# Patient Record
Sex: FEMALE | Race: WHITE | Employment: OTHER | URBAN - NONMETROPOLITAN AREA
[De-identification: names, ages, dates, MRNs, and addresses within clinical notes are randomized per-mention and may not be internally consistent; named-entity substitution may affect disease eponyms.]

---

## 2017-02-23 ENCOUNTER — OFFICE VISIT (OUTPATIENT)
Dept: FAMILY MEDICINE CLINIC | Age: 32
End: 2017-02-23

## 2017-02-23 VITALS
HEART RATE: 62 BPM | TEMPERATURE: 97.7 F | HEIGHT: 64 IN | DIASTOLIC BLOOD PRESSURE: 60 MMHG | SYSTOLIC BLOOD PRESSURE: 92 MMHG | OXYGEN SATURATION: 98 % | RESPIRATION RATE: 12 BRPM | WEIGHT: 133 LBS | BODY MASS INDEX: 22.71 KG/M2

## 2017-02-23 DIAGNOSIS — H92.03 OTALGIA, BILATERAL: ICD-10-CM

## 2017-02-23 DIAGNOSIS — Z76.89 ENCOUNTER TO ESTABLISH CARE: Primary | ICD-10-CM

## 2017-02-23 PROCEDURE — 99395 PREV VISIT EST AGE 18-39: CPT | Performed by: NURSE PRACTITIONER

## 2017-02-23 RX ORDER — MEDROXYPROGESTERONE ACETATE 10 MG/1
20 TABLET ORAL DAILY
COMMUNITY
End: 2018-02-23 | Stop reason: ALTCHOICE

## 2017-02-23 ASSESSMENT — ENCOUNTER SYMPTOMS
ALLERGIC/IMMUNOLOGIC NEGATIVE: 1
RESPIRATORY NEGATIVE: 1
DIARRHEA: 0
GASTROINTESTINAL NEGATIVE: 1
SINUS PRESSURE: 0
CONSTIPATION: 0
CHEST TIGHTNESS: 0
SHORTNESS OF BREATH: 0
ABDOMINAL PAIN: 0
VOMITING: 0
COUGH: 0
EYES NEGATIVE: 1
NAUSEA: 0
TROUBLE SWALLOWING: 0

## 2017-10-20 ENCOUNTER — HOSPITAL ENCOUNTER (OUTPATIENT)
Dept: LAB | Age: 32
Setting detail: SPECIMEN
Discharge: HOME | End: 2017-10-20
Payer: COMMERCIAL

## 2017-10-20 LAB
ABSOLUTE EOS #: 0.1 K/UL (ref 0–0.4)
ABSOLUTE IMMATURE GRANULOCYTE: NORMAL K/UL (ref 0–0.3)
ABSOLUTE LYMPH #: 1.6 K/UL (ref 1–4.8)
ABSOLUTE MONO #: 0.3 K/UL (ref 0.1–1.2)
BASOPHILS # BLD: 1 % (ref 0–1)
BASOPHILS ABSOLUTE: 0.1 K/UL (ref 0–0.2)
DIFFERENTIAL TYPE: NORMAL
EOSINOPHILS RELATIVE PERCENT: 3 % (ref 1–7)
FOLLICLE STIMULATING HORMONE: 5.9 U/L (ref 1.7–21.5)
GLUCOSE BLD-MCNC: 89 MG/DL (ref 70–99)
HCG QUANTITATIVE: <1 IU/L
HCT VFR BLD CALC: 41.9 % (ref 36–46)
HEMOGLOBIN: 14.2 G/DL (ref 12–16)
IMMATURE GRANULOCYTES: NORMAL %
INSULIN COMMENT: NORMAL
INSULIN REFERENCE RANGE:: NORMAL
INSULIN: 5.7 MU/L
LH: 5.6 U/L (ref 1–95.6)
LYMPHOCYTES # BLD: 33 % (ref 16–46)
MCH RBC QN AUTO: 28.9 PG (ref 26–34)
MCHC RBC AUTO-ENTMCNC: 33.9 G/DL (ref 31–37)
MCV RBC AUTO: 85.3 FL (ref 80–100)
MONOCYTES # BLD: 6 % (ref 4–11)
PDW BLD-RTO: 14.3 % (ref 11–14.5)
PLATELET # BLD: 244 K/UL (ref 140–450)
PLATELET ESTIMATE: NORMAL
PMV BLD AUTO: 8.6 FL (ref 6–12)
PROGESTERONE LEVEL: 0.08 NG/ML
PROLACTIN: 39.47 UG/L (ref 4.79–23.3)
RBC # BLD: 4.91 M/UL (ref 4–5.2)
RBC # BLD: NORMAL 10*6/UL
SEG NEUTROPHILS: 57 % (ref 43–77)
SEGMENTED NEUTROPHILS ABSOLUTE COUNT: 2.8 K/UL (ref 1.8–7.7)
THYROXINE, FREE: 0.95 NG/DL (ref 0.93–1.7)
TSH SERPL DL<=0.05 MIU/L-ACNC: 1.37 MIU/L (ref 0.3–5)
WBC # BLD: 4.8 K/UL (ref 3.5–11)
WBC # BLD: NORMAL 10*3/UL

## 2017-10-20 PROCEDURE — 84443 ASSAY THYROID STIM HORMONE: CPT

## 2017-10-20 PROCEDURE — 83001 ASSAY OF GONADOTROPIN (FSH): CPT

## 2017-10-20 PROCEDURE — 82947 ASSAY GLUCOSE BLOOD QUANT: CPT

## 2017-10-20 PROCEDURE — 84146 ASSAY OF PROLACTIN: CPT

## 2017-10-20 PROCEDURE — 83525 ASSAY OF INSULIN: CPT

## 2017-10-20 PROCEDURE — 36415 COLL VENOUS BLD VENIPUNCTURE: CPT

## 2017-10-20 PROCEDURE — 84439 ASSAY OF FREE THYROXINE: CPT

## 2017-10-20 PROCEDURE — 84702 CHORIONIC GONADOTROPIN TEST: CPT

## 2017-10-20 PROCEDURE — 85025 COMPLETE CBC W/AUTO DIFF WBC: CPT

## 2017-10-20 PROCEDURE — 83002 ASSAY OF GONADOTROPIN (LH): CPT

## 2017-10-20 PROCEDURE — 84144 ASSAY OF PROGESTERONE: CPT

## 2017-12-18 ENCOUNTER — HOSPITAL ENCOUNTER (OUTPATIENT)
Dept: LAB | Age: 32
Setting detail: SPECIMEN
Discharge: HOME | End: 2017-12-18
Payer: COMMERCIAL

## 2017-12-18 LAB — PROLACTIN: 46.79 UG/L (ref 4.79–23.3)

## 2017-12-18 PROCEDURE — 84146 ASSAY OF PROLACTIN: CPT

## 2017-12-18 PROCEDURE — 36415 COLL VENOUS BLD VENIPUNCTURE: CPT

## 2018-02-23 ENCOUNTER — OFFICE VISIT (OUTPATIENT)
Dept: FAMILY MEDICINE CLINIC | Age: 33
End: 2018-02-23
Payer: COMMERCIAL

## 2018-02-23 VITALS
HEIGHT: 64 IN | SYSTOLIC BLOOD PRESSURE: 100 MMHG | WEIGHT: 139.8 LBS | RESPIRATION RATE: 12 BRPM | DIASTOLIC BLOOD PRESSURE: 60 MMHG | HEART RATE: 64 BPM | OXYGEN SATURATION: 100 % | BODY MASS INDEX: 23.87 KG/M2 | TEMPERATURE: 97.9 F

## 2018-02-23 DIAGNOSIS — Z00.00 EXAMINATION, MEDICAL, GENERAL: Primary | ICD-10-CM

## 2018-02-23 PROCEDURE — 99395 PREV VISIT EST AGE 18-39: CPT | Performed by: NURSE PRACTITIONER

## 2018-02-23 RX ORDER — METFORMIN HYDROCHLORIDE 500 MG/1
500 TABLET, EXTENDED RELEASE ORAL DAILY
COMMUNITY
Start: 2018-02-22 | End: 2022-10-17 | Stop reason: ALTCHOICE

## 2018-02-23 RX ORDER — MEDROXYPROGESTERONE ACETATE 10 MG/1
20 TABLET ORAL DAILY
COMMUNITY
End: 2018-02-23 | Stop reason: SDUPTHER

## 2018-02-23 ASSESSMENT — ENCOUNTER SYMPTOMS
CONSTIPATION: 0
COUGH: 0
NAUSEA: 0
SHORTNESS OF BREATH: 0
ABDOMINAL PAIN: 0
TROUBLE SWALLOWING: 0
GASTROINTESTINAL NEGATIVE: 1
SINUS PRESSURE: 0
RESPIRATORY NEGATIVE: 1
VOMITING: 0
DIARRHEA: 0
CHEST TIGHTNESS: 0
ALLERGIC/IMMUNOLOGIC NEGATIVE: 1
EYES NEGATIVE: 1

## 2018-02-23 ASSESSMENT — PATIENT HEALTH QUESTIONNAIRE - PHQ9
1. LITTLE INTEREST OR PLEASURE IN DOING THINGS: 0
2. FEELING DOWN, DEPRESSED OR HOPELESS: 0
SUM OF ALL RESPONSES TO PHQ9 QUESTIONS 1 & 2: 0
SUM OF ALL RESPONSES TO PHQ QUESTIONS 1-9: 0

## 2018-02-23 NOTE — PROGRESS NOTES
[facility]    Subjective:      Patient ID: Xander Beverly is a 27 y.o. y.o. female. HPI Patient in for office for annual routine health maintenance of chronic conditions and medication refills. I have reviewed the patient's medical history in detail and updated the computerized patient record. She voices no concerns at this visit. She is currently seeing OB/GYN in [facility] as she is trying to get pregnant. She is also seeing an endocrinologist as well. She is on Metformin and progesterone for them for this but just started this week. She states that last year they failed IVF so they are trying something different. Past Medical History:   Diagnosis Date    Headache        Past Surgical History:   Procedure Laterality Date    DILATION AND CURETTAGE OF UTERUS      Aug 2016 & Dec 2016       Family History   Problem Relation Age of Onset    Diabetes Father     High Blood Pressure Father     Other Sister      endometrious/hysterectomy       Allergies   Allergen Reactions    Peanut-Containing Drug Products Other (See Comments)       Current Outpatient Prescriptions   Medication Sig Dispense Refill    metFORMIN (GLUCOPHAGE-XR) 500 MG extended release tablet Take 500 mg by mouth daily      Progesterone Micronized (PROGESTERONE PO) Take 20 mg by mouth daily      Prenatal MV-Min-Fe Fum-FA-DHA (PRENATAL 1 PO) Take 1 capsule by mouth daily      b complex vitamins capsule Take 1 capsule by mouth daily       No current facility-administered medications for this visit. Review of Systems   Constitutional: Negative for activity change, appetite change and fever. HENT: Negative. Negative for congestion, ear pain, sinus pressure and trouble swallowing. Eyes: Negative. Respiratory: Negative. Negative for cough, chest tightness and shortness of breath. Cardiovascular: Negative for chest pain and palpitations. Gastrointestinal: Negative.   Negative for abdominal pain, of care. Follow up in one year or sooner if needed.        Nell Obrien NP  2/23/2018 1:30 PM

## 2018-02-23 NOTE — PROGRESS NOTES
Visit Information    Have you changed or started any medications since your last visit including any over-the-counter medicines, vitamins, or herbal medicines? no   Are you having any side effects from any of your medications? -  no  Have you stopped taking any of your medications? Is so, why? -  no    Have you seen any other physician or provider since your last visit? Yes - Records Requested  Have you had any other diagnostic tests since your last visit? No  Have you been seen in the emergency room and/or had an admission to a hospital since we last saw you? No  Have you had your routine dental cleaning in the past 6 months? yes - [facility]     Have you activated your [employer] account? If not, what are your barriers?  No: pending     Patient Care Team:  Nell Obrien NP as PCP - General (Family Medicine)    Medical History Review  Past Medical, Family, and Social History reviewed and does contribute to the patient presenting condition    Health Maintenance   Topic Date Due    Cervical cancer screen  03/15/2004    DTaP/Tdap/Td vaccine (1 - Tdap) 02/23/2018 (Originally 3/15/2002)    Flu vaccine (1) 02/23/2018 (Originally 9/1/2017)    HIV screen  Completed

## 2018-09-24 ENCOUNTER — HOSPITAL ENCOUNTER (OUTPATIENT)
Dept: LAB | Age: 33
Setting detail: SPECIMEN
Discharge: HOME | End: 2018-09-24
Payer: COMMERCIAL

## 2018-09-24 LAB — PROGESTERONE LEVEL: 0.17 NG/ML

## 2018-09-24 PROCEDURE — 84144 ASSAY OF PROGESTERONE: CPT

## 2018-09-24 PROCEDURE — 36415 COLL VENOUS BLD VENIPUNCTURE: CPT

## 2018-09-26 ENCOUNTER — HOSPITAL ENCOUNTER (OUTPATIENT)
Dept: LAB | Age: 33
Setting detail: SPECIMEN
Discharge: HOME | End: 2018-09-26
Payer: COMMERCIAL

## 2018-09-26 LAB — PROGESTERONE LEVEL: 7.18 NG/ML

## 2018-09-26 PROCEDURE — 84144 ASSAY OF PROGESTERONE: CPT

## 2018-09-26 PROCEDURE — 36415 COLL VENOUS BLD VENIPUNCTURE: CPT

## 2018-11-19 ENCOUNTER — HOSPITAL ENCOUNTER (OUTPATIENT)
Dept: LAB | Age: 33
Discharge: HOME | End: 2018-11-19
Payer: COMMERCIAL

## 2018-11-19 LAB — HCG QUANTITATIVE: 192 IU/L

## 2018-11-19 PROCEDURE — 84702 CHORIONIC GONADOTROPIN TEST: CPT

## 2018-11-19 PROCEDURE — 36415 COLL VENOUS BLD VENIPUNCTURE: CPT

## 2018-11-21 ENCOUNTER — HOSPITAL ENCOUNTER (OUTPATIENT)
Dept: LAB | Age: 33
Discharge: HOME | End: 2018-11-21
Payer: COMMERCIAL

## 2018-11-21 LAB
HCG QUANTITATIVE: 408 IU/L
PROGESTERONE LEVEL: 34.21 NG/ML

## 2018-11-21 PROCEDURE — 84702 CHORIONIC GONADOTROPIN TEST: CPT

## 2018-11-21 PROCEDURE — 84144 ASSAY OF PROGESTERONE: CPT

## 2018-11-21 PROCEDURE — 36415 COLL VENOUS BLD VENIPUNCTURE: CPT

## 2018-12-05 ENCOUNTER — HOSPITAL ENCOUNTER (OUTPATIENT)
Dept: LAB | Age: 33
Discharge: HOME | End: 2018-12-05
Payer: COMMERCIAL

## 2018-12-05 LAB — PROGESTERONE LEVEL: 26.39 NG/ML

## 2018-12-05 PROCEDURE — 36415 COLL VENOUS BLD VENIPUNCTURE: CPT

## 2018-12-05 PROCEDURE — 84144 ASSAY OF PROGESTERONE: CPT

## 2022-10-17 ENCOUNTER — OFFICE VISIT (OUTPATIENT)
Dept: PODIATRY | Age: 37
End: 2022-10-17
Payer: COMMERCIAL

## 2022-10-17 VITALS
HEART RATE: 68 BPM | DIASTOLIC BLOOD PRESSURE: 62 MMHG | SYSTOLIC BLOOD PRESSURE: 106 MMHG | RESPIRATION RATE: 20 BRPM | WEIGHT: 152 LBS | BODY MASS INDEX: 26.08 KG/M2

## 2022-10-17 DIAGNOSIS — L60.8 LONGITUDINAL MELANONYCHIA: Primary | ICD-10-CM

## 2022-10-17 PROCEDURE — G8484 FLU IMMUNIZE NO ADMIN: HCPCS | Performed by: PODIATRIST

## 2022-10-17 PROCEDURE — G8427 DOCREV CUR MEDS BY ELIG CLIN: HCPCS | Performed by: PODIATRIST

## 2022-10-17 PROCEDURE — G8419 CALC BMI OUT NRM PARAM NOF/U: HCPCS | Performed by: PODIATRIST

## 2022-10-17 PROCEDURE — 1036F TOBACCO NON-USER: CPT | Performed by: PODIATRIST

## 2022-10-17 PROCEDURE — 99203 OFFICE O/P NEW LOW 30 MIN: CPT | Performed by: PODIATRIST

## 2022-10-17 NOTE — PROGRESS NOTES
Subjective:  Delta Ellis is a 42 y.o. female who presents to the office today complaining of discolored toenail. .  Symptoms began 20 year(s) ago. Area has not changed at all over time. Always looked the same. Patient relates pain is Absent . Pain is rated 0 out of 10 and is described as none. Treatments prior to today's visit include: none. Currently denies F/C/N/V. Allergies   Allergen Reactions    Peanut-Containing Drug Products Other (See Comments)       Past Medical History:   Diagnosis Date    Headache        Prior to Admission medications    Medication Sig Start Date End Date Taking? Authorizing Provider   Progesterone Micronized (PROGESTERONE PO) Take 20 mg by mouth daily   Yes Historical Provider, MD   medroxyPROGESTERone (PROVERA) 10 MG tablet Take 20 mg by mouth daily  2/23/18  Historical Provider, MD       Past Surgical History:   Procedure Laterality Date    DILATION AND CURETTAGE OF UTERUS      Aug 2016 & Dec 2016    MYOMECTOMY         Family History   Problem Relation Age of Onset    Diabetes Father     High Blood Pressure Father     Other Sister         endometrious/hysterectomy       Social History     Tobacco Use    Smoking status: Never    Smokeless tobacco: Never   Substance Use Topics    Alcohol use: Yes     Alcohol/week: 0.0 standard drinks     Comment: socially       ROS: All 14 ROS systems reviewed and pertinent positives noted above, all others negative. Lower Extremity Physical Examination:     Vitals:   Vitals:    10/17/22 1514   BP: 106/62   Pulse: 68   Resp: 20     General: AAO x 3 in NAD. Vascular: DP and PT pulses palpable 2/4, bilateral.  CFT <3 seconds, bilateral.  Hair growth present to the level of the digits, bilateral.  Edema absent, bilateral.  Varicosities absent, bilateral. Erythema absent, bilateral. Distal Rubor absent bilateral.  Temperature within normal limits bilateral. Hyperpigmentation absent bilateral. No atrophic skin.    Neurological: Sensation intact to light touch to level of digits, bilateral.  Protective sensation intact 10/10 sites via 5.07/10g Ty Ty-Sid Monofilament, bilateral.  negative Tinel's, bilateral.  negative Valleix sign, bilateral.  Vibratory intact bilateral.  Reflexes Decreased bilateral.  Paresthesias negative. Dysthesias negative. Sharp/dull intact bilateral.   Musculoskeletal: Muscle strength 5/5, bilateral.  Pain absent upon palpation bilateral. Normal medial longitudinal arch, bilateral.  Ankle ROM within normal limits,bilateral.  1st MPJ ROM within normal limits, bilateral.  Dorsally contracted digits absent. No other foot deformities. Integument: Warm, dry, supple, Bilateral.  Open lesion absent, Bilateral.  Interdigital maceration absent to web spaces absent, Bilateral.  Nails left 1 central longitudinal melanonychia. 3 mm width and completely uniform color distribution. Fissures absent, Bilateral. Hyperkeratotic tissue is absent. Asessment: Patient is a 42 y.o. female with:    Diagnosis Orders   1. Longitudinal melanonychia            Plan: Patient examined and evaluated. Current condition and treatment options discussed in detail. Picture taken L hallux. Width of area 3 mm. Patient advised the risk of complications of her situation, we will hold off on a nail unit biopsy. Patient wishes to wait on that at this time. Patient advised  to watch closely for any widening of the longitudinal melanonychia left great toe. Patient also advised a darker color discolorations would want to proceed with biopsy. Patient low level medical decision making. Patient is stable chronic condition. No new testing or prescriptions. Lower extremity the current treatment course. Contact office with any questions/problems/concerns. RTC in 6month(s) to compare lesion. [facility]

## 2023-04-17 ENCOUNTER — OFFICE VISIT (OUTPATIENT)
Dept: PODIATRY | Age: 38
End: 2023-04-17
Payer: COMMERCIAL

## 2023-04-17 VITALS
HEIGHT: 64 IN | SYSTOLIC BLOOD PRESSURE: 94 MMHG | HEART RATE: 66 BPM | DIASTOLIC BLOOD PRESSURE: 68 MMHG | BODY MASS INDEX: 25.95 KG/M2 | WEIGHT: 152 LBS

## 2023-04-17 DIAGNOSIS — L60.8 LONGITUDINAL MELANONYCHIA: Primary | ICD-10-CM

## 2023-04-17 PROCEDURE — 99213 OFFICE O/P EST LOW 20 MIN: CPT | Performed by: PODIATRIST

## 2023-04-17 PROCEDURE — G8419 CALC BMI OUT NRM PARAM NOF/U: HCPCS | Performed by: PODIATRIST

## 2023-04-17 PROCEDURE — 1036F TOBACCO NON-USER: CPT | Performed by: PODIATRIST

## 2023-04-17 PROCEDURE — G8427 DOCREV CUR MEDS BY ELIG CLIN: HCPCS | Performed by: PODIATRIST

## 2023-04-17 RX ORDER — LORATADINE 10 MG/1
10 CAPSULE, LIQUID FILLED ORAL DAILY
COMMUNITY

## 2023-04-17 NOTE — PROGRESS NOTES
Subjective:  Arianne Bejarano is a 34 y.o. female who presents to the office today complaining of discolored toenail. .  Symptoms unchanged. .  no changes seen. Pt has been watching closely. Always looked the same. Patient relates pain is Absent . Pain is rated 0 out of 10 and is described as none. Treatments prior to today's visit include: none. Currently denies F/C/N/V. Allergies   Allergen Reactions    Peanut-Containing Drug Products Other (See Comments)       Past Medical History:   Diagnosis Date    Headache        Prior to Admission medications    Medication Sig Start Date End Date Taking? Authorizing Provider   loratadine (CLARITIN) 10 MG capsule Take 1 capsule by mouth daily   Yes Historical Provider, MD   Progesterone Micronized (PROGESTERONE PO) Take 20 mg by mouth daily   Yes Historical Provider, MD   medroxyPROGESTERone (PROVERA) 10 MG tablet Take 20 mg by mouth daily  2/23/18  Historical Provider, MD       Past Surgical History:   Procedure Laterality Date    DILATION AND CURETTAGE OF UTERUS      Aug 2016 & Dec 2016    MYOMECTOMY         Family History   Problem Relation Age of Onset    Diabetes Father     High Blood Pressure Father     Other Sister         endometrious/hysterectomy       Social History     Tobacco Use    Smoking status: Never    Smokeless tobacco: Never   Substance Use Topics    Alcohol use: Yes     Alcohol/week: 0.0 standard drinks     Comment: socially       ROS: All 14 ROS systems reviewed and pertinent positives noted above, all others negative. Lower Extremity Physical Examination:     Vitals:   Vitals:    04/17/23 1638   BP: 94/68   Pulse: 66     General: AAO x 3 in NAD.      Vascular: DP and PT pulses palpable 2/4, bilateral.  CFT <3 seconds, bilateral.  Hair growth present to the level of the digits, bilateral.  Edema absent, bilateral.  Varicosities absent, bilateral. Erythema absent, bilateral. Distal Rubor absent bilateral.  Temperature within normal limits bilateral.